# Patient Record
Sex: FEMALE | Race: WHITE | NOT HISPANIC OR LATINO | URBAN - METROPOLITAN AREA
[De-identification: names, ages, dates, MRNs, and addresses within clinical notes are randomized per-mention and may not be internally consistent; named-entity substitution may affect disease eponyms.]

---

## 2022-08-12 ENCOUNTER — HOSPITAL ENCOUNTER (EMERGENCY)
Facility: HOSPITAL | Age: 42
Discharge: PSYCHIATRIC OR SUBSTANCE USE FACILITY | End: 2022-08-13
Attending: EMERGENCY MEDICINE
Payer: OTHER GOVERNMENT

## 2022-08-12 DIAGNOSIS — R44.0 AUDITORY HALLUCINATIONS: Primary | ICD-10-CM

## 2022-08-12 DIAGNOSIS — F22 PARANOIA: ICD-10-CM

## 2022-08-12 DIAGNOSIS — Z00.8 MEDICAL CLEARANCE FOR PSYCHIATRIC ADMISSION: ICD-10-CM

## 2022-08-12 LAB
ALBUMIN SERPL BCP-MCNC: 4.4 G/DL (ref 3.5–5.2)
ALP SERPL-CCNC: 67 U/L (ref 38–126)
ALT SERPL W/O P-5'-P-CCNC: 56 U/L (ref 10–44)
AMPHET+METHAMPHET UR QL: NEGATIVE
ANION GAP SERPL CALC-SCNC: 9 MMOL/L (ref 8–16)
APAP SERPL-MCNC: <10 UG/ML (ref 10–20)
AST SERPL-CCNC: 32 U/L (ref 15–46)
B-HCG UR QL: NEGATIVE
BARBITURATES UR QL SCN>200 NG/ML: NEGATIVE
BASOPHILS # BLD AUTO: 0.05 K/UL (ref 0–0.2)
BASOPHILS NFR BLD: 0.4 % (ref 0–1.9)
BENZODIAZ UR QL SCN>200 NG/ML: NEGATIVE
BILIRUB SERPL-MCNC: 0.5 MG/DL (ref 0.1–1)
BILIRUB UR QL STRIP: NEGATIVE
BZE UR QL SCN: NEGATIVE
CALCIUM SERPL-MCNC: 9.8 MG/DL (ref 8.7–10.5)
CANNABINOIDS UR QL SCN: NEGATIVE
CHLORIDE SERPL-SCNC: 107 MMOL/L (ref 95–110)
CLARITY UR REFRACT.AUTO: CLEAR
CO2 SERPL-SCNC: 23 MMOL/L (ref 23–29)
COLOR UR AUTO: YELLOW
CREAT SERPL-MCNC: 0.8 MG/DL (ref 0.5–1.4)
CREAT UR-MCNC: 214.7 MG/DL (ref 15–325)
DIFFERENTIAL METHOD: ABNORMAL
EOSINOPHIL # BLD AUTO: 0.1 K/UL (ref 0–0.5)
EOSINOPHIL NFR BLD: 0.6 % (ref 0–8)
ERYTHROCYTE [DISTWIDTH] IN BLOOD BY AUTOMATED COUNT: 13.2 % (ref 11.5–14.5)
EST. GFR  (NO RACE VARIABLE): >60 ML/MIN/1.73 M^2
ETHANOL SERPL-MCNC: <10 MG/DL
GLUCOSE SERPL-MCNC: 119 MG/DL (ref 70–110)
GLUCOSE UR QL STRIP: NEGATIVE
HCT VFR BLD AUTO: 42.1 % (ref 37–48.5)
HGB BLD-MCNC: 13.9 G/DL (ref 12–16)
HGB UR QL STRIP: NEGATIVE
IMM GRANULOCYTES # BLD AUTO: 0.03 K/UL (ref 0–0.04)
IMM GRANULOCYTES NFR BLD AUTO: 0.2 % (ref 0–0.5)
KETONES UR QL STRIP: NEGATIVE
LEUKOCYTE ESTERASE UR QL STRIP: NEGATIVE
LYMPHOCYTES # BLD AUTO: 3.9 K/UL (ref 1–4.8)
LYMPHOCYTES NFR BLD: 27.5 % (ref 18–48)
MCH RBC QN AUTO: 28.8 PG (ref 27–31)
MCHC RBC AUTO-ENTMCNC: 33 G/DL (ref 32–36)
MCV RBC AUTO: 87 FL (ref 82–98)
METHADONE UR QL SCN>300 NG/ML: NEGATIVE
MONOCYTES # BLD AUTO: 1.1 K/UL (ref 0.3–1)
MONOCYTES NFR BLD: 7.9 % (ref 4–15)
NEUTROPHILS # BLD AUTO: 8.9 K/UL (ref 1.8–7.7)
NEUTROPHILS NFR BLD: 63.4 % (ref 38–73)
NITRITE UR QL STRIP: NEGATIVE
NRBC BLD-RTO: 0 /100 WBC
OPIATES UR QL SCN: NEGATIVE
PCP UR QL SCN>25 NG/ML: NEGATIVE
PH UR STRIP: 6 [PH] (ref 5–8)
PLATELET # BLD AUTO: 342 K/UL (ref 150–450)
PMV BLD AUTO: 11 FL (ref 9.2–12.9)
POTASSIUM SERPL-SCNC: 3.7 MMOL/L (ref 3.5–5.1)
PROT SERPL-MCNC: 7.2 G/DL (ref 6–8.4)
PROT UR QL STRIP: NEGATIVE
RBC # BLD AUTO: 4.82 M/UL (ref 4–5.4)
SALICYLATES SERPL-MCNC: <5 MG/DL (ref 15–30)
SARS-COV-2 RDRP RESP QL NAA+PROBE: NEGATIVE
SODIUM SERPL-SCNC: 139 MMOL/L (ref 136–145)
SP GR UR STRIP: 1.02 (ref 1–1.03)
TOXICOLOGY INFORMATION: NORMAL
URN SPEC COLLECT METH UR: NORMAL
UROBILINOGEN UR STRIP-ACNC: NEGATIVE EU/DL
UUN UR-MCNC: 21 MG/DL (ref 7–17)
WBC # BLD AUTO: 14.02 K/UL (ref 3.9–12.7)

## 2022-08-12 PROCEDURE — 93010 ELECTROCARDIOGRAM REPORT: CPT | Mod: ,,, | Performed by: INTERNAL MEDICINE

## 2022-08-12 PROCEDURE — 93005 ELECTROCARDIOGRAM TRACING: CPT | Mod: ER

## 2022-08-12 PROCEDURE — 99900035 HC TECH TIME PER 15 MIN (STAT): Mod: ER

## 2022-08-12 PROCEDURE — 85025 COMPLETE CBC W/AUTO DIFF WBC: CPT | Mod: ER | Performed by: EMERGENCY MEDICINE

## 2022-08-12 PROCEDURE — 80053 COMPREHEN METABOLIC PANEL: CPT | Mod: ER | Performed by: EMERGENCY MEDICINE

## 2022-08-12 PROCEDURE — 80307 DRUG TEST PRSMV CHEM ANLYZR: CPT | Mod: ER | Performed by: EMERGENCY MEDICINE

## 2022-08-12 PROCEDURE — 82077 ASSAY SPEC XCP UR&BREATH IA: CPT | Mod: ER | Performed by: EMERGENCY MEDICINE

## 2022-08-12 PROCEDURE — 25000003 PHARM REV CODE 250: Mod: ER | Performed by: EMERGENCY MEDICINE

## 2022-08-12 PROCEDURE — 80179 DRUG ASSAY SALICYLATE: CPT | Mod: ER | Performed by: EMERGENCY MEDICINE

## 2022-08-12 PROCEDURE — 99285 EMERGENCY DEPT VISIT HI MDM: CPT | Mod: ER

## 2022-08-12 PROCEDURE — 81003 URINALYSIS AUTO W/O SCOPE: CPT | Mod: ER | Performed by: EMERGENCY MEDICINE

## 2022-08-12 PROCEDURE — 93010 EKG 12-LEAD: ICD-10-PCS | Mod: ,,, | Performed by: INTERNAL MEDICINE

## 2022-08-12 PROCEDURE — 80143 DRUG ASSAY ACETAMINOPHEN: CPT | Mod: ER | Performed by: EMERGENCY MEDICINE

## 2022-08-12 PROCEDURE — U0002 COVID-19 LAB TEST NON-CDC: HCPCS | Mod: ER | Performed by: EMERGENCY MEDICINE

## 2022-08-12 PROCEDURE — 81025 URINE PREGNANCY TEST: CPT | Mod: ER | Performed by: EMERGENCY MEDICINE

## 2022-08-12 RX ORDER — TRAZODONE HYDROCHLORIDE 100 MG/1
TABLET ORAL
Status: ON HOLD | COMMUNITY
End: 2022-08-24 | Stop reason: HOSPADM

## 2022-08-12 RX ORDER — PANTOPRAZOLE SODIUM 40 MG/1
40 TABLET, DELAYED RELEASE ORAL DAILY
COMMUNITY
End: 2022-08-24

## 2022-08-12 RX ORDER — PREGABALIN 200 MG/1
CAPSULE ORAL
COMMUNITY
End: 2022-08-24

## 2022-08-12 RX ORDER — ESCITALOPRAM OXALATE 20 MG/1
20 TABLET ORAL DAILY
Status: ON HOLD | COMMUNITY
End: 2022-08-24 | Stop reason: SDUPTHER

## 2022-08-12 RX ORDER — OMEPRAZOLE/SODIUM BICARBONATE 20MG-1.1G
CAPSULE ORAL
COMMUNITY
End: 2022-08-12

## 2022-08-12 RX ORDER — FAMOTIDINE 20 MG/1
20 TABLET, FILM COATED ORAL 2 TIMES DAILY
COMMUNITY
End: 2022-08-24

## 2022-08-12 RX ORDER — LORAZEPAM 1 MG/1
1 TABLET ORAL
Status: COMPLETED | OUTPATIENT
Start: 2022-08-12 | End: 2022-08-12

## 2022-08-12 RX ADMIN — LORAZEPAM 1 MG: 1 TABLET ORAL at 08:08

## 2022-08-13 VITALS
RESPIRATION RATE: 20 BRPM | OXYGEN SATURATION: 100 % | DIASTOLIC BLOOD PRESSURE: 69 MMHG | TEMPERATURE: 99 F | WEIGHT: 198 LBS | SYSTOLIC BLOOD PRESSURE: 105 MMHG | HEART RATE: 68 BPM | BODY MASS INDEX: 35.08 KG/M2 | HEIGHT: 63 IN

## 2022-08-13 PROBLEM — D72.829 ELEVATED WBC COUNT: Status: ACTIVE | Noted: 2022-08-13

## 2022-08-13 PROBLEM — R44.3 HALLUCINATIONS: Status: ACTIVE | Noted: 2022-08-13

## 2022-08-13 PROBLEM — J45.20 MILD INTERMITTENT ASTHMA WITHOUT COMPLICATION: Status: ACTIVE | Noted: 2022-08-13

## 2022-08-13 NOTE — ED NOTES
Pt states that she arrived to the ED via ambulance for having auditory hallucinations and thinks people are trying to hurt her. Pt reports that she was just discharged from a psych facility today and has no where to go. She states she is homeless at this time. She reports having no family to notify that she is here.

## 2022-08-13 NOTE — ED PROVIDER NOTES
Encounter Date: 2022       History     Chief Complaint   Patient presents with    Psychiatric Evaluation     Pt c/o auditory and visual hallucinations for a couple weeks. Denies any SI/HI. Hx of bipolar. Recently admitted in psych facility.     Nelly Brewster is a 42 y.o. female who  has a past medical history of Bipolar disorder, unspecified.    The patient presents to the ED due to thoughts that people are trying to kill her and increased visual hallucinations which she describes as shadows.  She states this has been ongoing for the past 2 weeks.  She states she was driving and felt worse and called EMS and was brought here.  She states she was discharge from psychiatric facility earlier today.  She cannot recall what medications she is currently taking but states that she took them this morning and that she was recently taken off of Seroquel.  She denies any drugs or alcohol use.  She states she feels shaky and and reports intermittent dizziness for the past 2 weeks.  She reports concerns that people are trying to hurt her.         Review of patient's allergies indicates:  No Known Allergies  Past Medical History:   Diagnosis Date    Bipolar disorder, unspecified      Past Surgical History:   Procedure Laterality Date    ADENOIDECTOMY      APPENDECTOMY       SECTION      HYSTERECTOMY      TONSILLECTOMY       History reviewed. No pertinent family history.  Social History     Tobacco Use    Smoking status: Current Every Day Smoker     Packs/day: 1.00     Types: Cigarettes    Smokeless tobacco: Never Used   Substance Use Topics    Alcohol use: Never    Drug use: Never     Review of Systems   Unable to perform ROS: Psychiatric disorder       Physical Exam     Initial Vitals [22 1853]   BP Pulse Resp Temp SpO2   115/85 99 20 98.5 °F (36.9 °C) 98 %      MAP       --         Physical Exam    Nursing note and vitals reviewed.  Constitutional: She appears well-developed and  well-nourished. She is not diaphoretic. No distress.   HENT:   Head: Normocephalic and atraumatic.   Mouth/Throat: Oropharynx is clear and moist.   Eyes: EOM are normal. Pupils are equal, round, and reactive to light.   Neck: No tracheal deviation present.   Cardiovascular: Normal rate, regular rhythm, normal heart sounds and intact distal pulses.   Pulmonary/Chest: Breath sounds normal. No stridor. No respiratory distress. She has no wheezes.   Abdominal: Abdomen is soft. Bowel sounds are normal. She exhibits no distension and no mass. There is no abdominal tenderness.   Musculoskeletal:         General: No edema. Normal range of motion.     Neurological: She is alert and oriented to person, place, and time. No cranial nerve deficit or sensory deficit.   CN 2-12 intact  Finger to nose within normal limits  Negative romberg  Gait normal  Equal strength to bilateral upper extremities, SILT  Equal strength to bilateral lower extremities, SILT     Skin: Skin is warm and dry. Capillary refill takes less than 2 seconds. No rash noted.   Psychiatric: Thought content is paranoid. She exhibits a depressed mood.         ED Course   Procedures  Labs Reviewed   CBC W/ AUTO DIFFERENTIAL - Abnormal; Notable for the following components:       Result Value    WBC 14.02 (*)     Gran # (ANC) 8.9 (*)     Mono # 1.1 (*)     All other components within normal limits   COMPREHENSIVE METABOLIC PANEL - Abnormal; Notable for the following components:    Glucose 119 (*)     BUN 21 (*)     ALT 56 (*)     All other components within normal limits   SALICYLATE LEVEL - Abnormal; Notable for the following components:    Salicylate Lvl <5.0 (*)     All other components within normal limits   URINALYSIS, REFLEX TO URINE CULTURE    Narrative:     Preferred Collection Type->Urine, Clean Catch  Specimen Source->Urine  Collection Type->Urine, Clean Catch   DRUG SCREEN PANEL, URINE EMERGENCY    Narrative:     Preferred Collection Type->Urine, Clean  Catch  Specimen Source->Urine  Collection Type->Urine, Clean Catch   ALCOHOL,MEDICAL (ETHANOL)   ACETAMINOPHEN LEVEL   SARS-COV-2 RNA AMPLIFICATION, QUAL    Narrative:     Is the patient symptomatic?->No   PREGNANCY TEST, URINE RAPID    Narrative:     Specimen Source->Urine          Imaging Results    None          Medications   LORazepam tablet 1 mg (1 mg Oral Given 8/12/22 2007)     Medical Decision Making:   Initial Assessment:   42-year-old female recently discharged from a psychiatric facility presents with persistent paranoid thinking auditory hallucinations.  Intermittent dizziness.  Her vital signs are stable.  Her neuro exam is nonfocal.  I do not suspect CVA/emergent etiology of her symptoms.  I will leave she meets criteria for pec due to grave disability due to her paranoid thinking and hallucinations.   Differential Diagnosis:   Differential Diagnosis includes, but is not limited to:  Decompensated psychiatric disease (schizophrenia, bipolar disorder, major depression), excited delirium, medication noncompliance, substance abuse/withdrawal, intentional drug overdose, medication toxicity, APAP/ASA overdose, acute stress reaction, personality disorder, malingering, metabolic derangement               ED Course as of 08/12/22 2118   Fri Aug 12, 2022   1952 EKG:  Rate 92.  Normal sinus rhythm.  No STEMI.  No ectopy. [RN]   2117 Patient has been reserved in the ED without acute event.  Her labs without significant abnormality and she is medically cleared for transfer to psychiatric facility. [RN]      ED Course User Index  [RN] Haider Stephenson Jr., MD        Medically cleared for psychiatry placement: 8/12/2022  9:15 PM    Clinical Impression:   Final diagnoses:  [Z00.8] Medical clearance for psychiatric admission  [R44.0] Auditory hallucinations (Primary)  [F22] Paranoia          ED Disposition Condition    Transfer to Psych Facility         ED Prescriptions     None        Follow-up Information    None          Portions of this note were dictated using voice recognition software and may contain dictation related errors in spelling/grammar/syntax not found on text review       Haider Stephenson Jr., MD  08/12/22 8867

## 2022-08-13 NOTE — ED NOTES
Pt provided with a copy of Patient Rights Under [place] Law.  Pt signed acknowledgements of Notification of Rights and witnessed by this writer.

## 2022-08-13 NOTE — ED NOTES
Pt notified that she was accepted at [facility].  Pt acknowledged understanding.

## 2023-04-25 ENCOUNTER — TELEPHONE (OUTPATIENT)
Dept: OBSTETRICS AND GYNECOLOGY | Facility: CLINIC | Age: 43
End: 2023-04-25
Payer: MEDICAID

## 2023-04-25 NOTE — TELEPHONE ENCOUNTER
Attempted to call Kimberly back, but there was no answer and no voicemail. We have not received a referral for this patient.

## 2023-04-25 NOTE — TELEPHONE ENCOUNTER
----- Message from Jyoti Zambrano sent at 4/25/2023 10:19 AM CDT -----  Type:  Needs Medical Advice    Who Called:  Kimberly reyes/ Karina MONSIVAIS   Symptoms (please be specific): -   How long has patient had these symptoms:  -  Pharmacy name and phone #:  -  Would the patient rather a call back or a response via [facility]?    Best Call Back Number: [phone]  Additional Information:  wants to know if referral has been received and if so when will pt be scheduled

## 2023-05-04 ENCOUNTER — TELEPHONE (OUTPATIENT)
Dept: OBSTETRICS AND GYNECOLOGY | Facility: CLINIC | Age: 43
End: 2023-05-04
Payer: MEDICAID

## 2023-05-04 NOTE — TELEPHONE ENCOUNTER
----- Message from Irene Hale sent at 5/4/2023 12:22 PM CDT -----  Contact: Patient  Patient called to consult with nurse or staff regarding a referral. She states she was calling to see if the referral had been received and would like a call back. She can be reached at [phone]. Thanks/

## 2023-05-04 NOTE — TELEPHONE ENCOUNTER
[facility] stated that [facility]. I told her we did not have it. She said she would try calling them to have them send it again.

## 2023-05-05 ENCOUNTER — HOSPITAL ENCOUNTER (EMERGENCY)
Facility: HOSPITAL | Age: 43
Discharge: HOME | End: 2023-05-05
Attending: FAMILY MEDICINE
Payer: MEDICAID

## 2023-05-05 VITALS
RESPIRATION RATE: 18 BRPM | HEART RATE: 90 BPM | TEMPERATURE: 97 F | OXYGEN SATURATION: 97 % | DIASTOLIC BLOOD PRESSURE: 79 MMHG | SYSTOLIC BLOOD PRESSURE: 133 MMHG

## 2023-05-05 DIAGNOSIS — M79.642 LEFT HAND PAIN: Primary | ICD-10-CM

## 2023-05-05 PROCEDURE — 99284 EMERGENCY DEPT VISIT MOD MDM: CPT

## 2023-05-05 RX ORDER — CLINDAMYCIN HYDROCHLORIDE 300 MG/1
300 CAPSULE ORAL EVERY 6 HOURS
Qty: 40 CAPSULE | Refills: 0 | Status: SHIPPED | OUTPATIENT
Start: 2023-05-05 | End: 2023-05-15

## 2023-05-05 RX ORDER — DICLOFENAC SODIUM 75 MG/1
75 TABLET, DELAYED RELEASE ORAL 2 TIMES DAILY PRN
Qty: 20 TABLET | Refills: 0 | Status: SHIPPED | OUTPATIENT
Start: 2023-05-05 | End: 2023-05-15

## 2023-05-06 NOTE — DISCHARGE INSTRUCTIONS
Use diclofenac twice a day as needed for pain  May use Norco 5/325 that was filled yesterday as needed for severe pain.  Recommend keeping left arm elevated, and applying ice to the affected area to help with pain.

## 2023-05-06 NOTE — ED TRIAGE NOTES
Pt states she was instructed by PCP to come to ED for evaluation of left hand to see if she has an infection. Pt states injury took place 2 days ago. Pt c/o increased pain and swelling.

## 2023-05-06 NOTE — ED PROVIDER NOTES
Encounter Date: 2023       History     Chief Complaint   Patient presents with    Hand Pain     Patient is a 43-year-old female presents emergency room complaints of left hand pain.  Patient reports that she was seen in [place] 2 days ago after accidentally stabbing herself in the left hand with scissors.  Patient reports having laceration repair performed there, but reports pain and swelling to the area.  Patient reports she saw her primary care physician today, and was sent to the emergency room for evaluation due to concerns about possible infection.  Patient reports that she had a bad experience in the past from an infection after having a , so patient wants to make sure she is not having any acute discomfort or worsening.  Patient currently calm and cooperative.    The history is provided by the patient.   Review of patient's allergies indicates:  No Known Allergies  Past Medical History:   Diagnosis Date    Bipolar disorder, unspecified     Nerve damage      Past Surgical History:   Procedure Laterality Date    ADENOIDECTOMY      APPENDECTOMY       SECTION      HYSTERECTOMY      TONSILLECTOMY       History reviewed. No pertinent family history.  Social History     Tobacco Use    Smoking status: Every Day     Packs/day: 1.00     Types: Cigarettes    Smokeless tobacco: Never   Substance Use Topics    Alcohol use: Never    Drug use: Never     Review of Systems   Constitutional:  Negative for chills, fatigue and fever.   HENT:  Negative for ear pain, rhinorrhea and sore throat.    Eyes:  Negative for photophobia and pain.   Respiratory:  Negative for cough, shortness of breath and wheezing.    Cardiovascular:  Negative for chest pain.   Gastrointestinal:  Negative for abdominal pain, diarrhea, nausea and vomiting.   Genitourinary:  Negative for dysuria.   Neurological:  Negative for dizziness, weakness and headaches.   All other systems reviewed and are negative.    Physical Exam      Initial Vitals [05/05/23 2145]   BP Pulse Resp Temp SpO2   133/79 90 18 97.4 °F (36.3 °C) 97 %      MAP       --         Physical Exam    Nursing note and vitals reviewed.  Constitutional: She appears well-developed and well-nourished. No distress.   HENT:   Head: Normocephalic and atraumatic.   Eyes: Conjunctivae and EOM are normal. Pupils are equal, round, and reactive to light.   Neck: Neck supple.   Normal range of motion.  Cardiovascular:  Normal rate, regular rhythm, normal heart sounds and intact distal pulses.           Pulmonary/Chest: Breath sounds normal. No respiratory distress. She has no wheezes. She has no rhonchi. She has no rales.   Abdominal: Abdomen is soft. Bowel sounds are normal. She exhibits no distension. There is no abdominal tenderness. There is no rebound and no guarding.   Musculoskeletal:         General: Normal range of motion.      Cervical back: Normal range of motion and neck supple.      Comments: Mild soft tissue swelling of the thenar aspect of the left hand.  Sutures intact.  No surrounding erythema.  Full range of motion of the left wrist without restriction.  Cap refill less than 2 seconds in all digits.     Neurological: She is alert and oriented to person, place, and time.   Skin: Skin is warm and dry. Capillary refill takes less than 2 seconds. No erythema.   Psychiatric: She has a normal mood and affect. Her behavior is normal. Judgment and thought content normal.             ED Course   Procedures  Labs Reviewed - No data to display       Imaging Results    None          Medications - No data to display  Medical Decision Making:   Initial Assessment:   Patient 43-year-old female presents emergency room for evaluation of left hand pain following suture placement performed 2 days prior to outside facility.  Currently site appears clean dry and intact.  No surrounding erythema at present.  Patient currently very concerned that she was going to get a secondary infection.   Discussed risks benefits with the patient, and will place on clindamycin.  A photo of the current sutures in place and hand taken for documentation.    Patient currently requesting pain medication.  On review of prescription monitoring, appears patient filled 12 Brandamore 5/325 yesterday.  Patient informed to use this medication for severe pain, but will also prescribe anti-inflammatories.  Patient stable for discharge to home.  ER precautions given for any acute worsening.                        Clinical Impression:   Final diagnoses:  [M79.642] Left hand pain (Primary)        ED Disposition Condition    Discharge Stable          ED Prescriptions       Medication Sig Dispense Start Date End Date Auth. Provider    diclofenac (VOLTAREN) 75 MG EC tablet Take 1 tablet (75 mg total) by mouth 2 (two) times daily as needed (Pain). 20 tablet 5/5/2023 5/15/2023 Hugo Moffett MD    clindamycin (CLEOCIN) 300 MG capsule Take 1 capsule (300 mg total) by mouth every 6 (six) hours. for 10 days 40 capsule 5/5/2023 5/15/2023 Hugo Moffett MD          Follow-up Information       Follow up With Specialties Details Why Contact Info    [facility] - Emergency Dept Emergency Medicine  As needed, If symptoms worsen [id number]  [facility] [id number] [phone]    Primary Care Physician  In 5 days               Hugo Moffett MD  05/05/23 6136

## 2023-06-16 ENCOUNTER — TELEPHONE (OUTPATIENT)
Dept: OBSTETRICS AND GYNECOLOGY | Facility: CLINIC | Age: 43
End: 2023-06-16
Payer: MEDICAID

## 2023-06-16 NOTE — TELEPHONE ENCOUNTER
----- Message from Irene Hale sent at 6/16/2023  8:58 AM CDT -----  Contact: Melias([facility])  Melisa called to consult with nurse or staff regarding an authorization for the patient. She wanted to verify if the authorization has been received so the patient can get scheduled for an appointment. She would like a call back and can be reached at [phone]. Thanks/MR

## 2023-06-16 NOTE — TELEPHONE ENCOUNTER
Phone call returned to Melisa, I received her voicemail  I left a message we will attempt to reach her on Monday

## 2023-06-26 ENCOUNTER — TELEPHONE (OUTPATIENT)
Dept: OBSTETRICS AND GYNECOLOGY | Facility: CLINIC | Age: 43
End: 2023-06-26
Payer: MEDICAID

## 2023-06-26 NOTE — TELEPHONE ENCOUNTER
Attempted to call number provided. Waited on hold for 13 minutes before speaking to someone. The person states that she does not have information for the [place] office and is unable to relay a message to them.

## 2023-06-26 NOTE — TELEPHONE ENCOUNTER
----- Message from Irene Hale sent at 6/26/2023  1:51 PM CDT -----  Contact: Che([facility])  Che called to consult with nurse or staff regarding the patient. She wanted to verify if the [facility] was [facility]. She would like a call back regarding this and can be reached at [phone]. Thanks/MR

## 2024-05-20 DIAGNOSIS — Z01.419 WELL WOMAN EXAM: Primary | ICD-10-CM

## 2024-06-06 ENCOUNTER — OFFICE VISIT (OUTPATIENT)
Dept: OBSTETRICS AND GYNECOLOGY | Facility: CLINIC | Age: 44
End: 2024-06-06
Payer: OTHER GOVERNMENT

## 2024-06-06 VITALS
BODY MASS INDEX: 27.1 KG/M2 | SYSTOLIC BLOOD PRESSURE: 118 MMHG | HEART RATE: 85 BPM | DIASTOLIC BLOOD PRESSURE: 80 MMHG | WEIGHT: 153 LBS

## 2024-06-06 DIAGNOSIS — Z01.419 WELL WOMAN EXAM: Primary | ICD-10-CM

## 2024-06-06 PROCEDURE — 99386 PREV VISIT NEW AGE 40-64: CPT | Mod: S$GLB,,, | Performed by: STUDENT IN AN ORGANIZED HEALTH CARE EDUCATION/TRAINING PROGRAM

## 2024-06-06 RX ORDER — HALOPERIDOL 2 MG/ML
SOLUTION ORAL 2 TIMES DAILY
COMMUNITY

## 2024-06-06 NOTE — PROGRESS NOTES
Chief Complaint: Annual Exam    HPI: 44 y.o.  here for Annual Exam.  Patient doing well today.  She does complain of sexual sensations around the vagina with no external stimulation.  She does have a history of hysterectomy, she reports she also had a bilateral salpingo-oophorectomy at that time.  She also reports difficulty with orgasm with her partner however she is able to have 1 by herself.  She does report having abnormal Pap smears prior to her hysterectomy, she has not had a Pap smears since her hysterectomy.  She has no other complaints today.    Review of Systems   Constitutional:  Negative for chills and fever.   HENT:  Negative for congestion and sore throat.    Respiratory:  Negative for cough and shortness of breath.    Cardiovascular:  Negative for chest pain and palpitations.   Gastrointestinal:  Negative for abdominal pain, nausea and vomiting.   Genitourinary:  Negative for dysuria, frequency and urgency.   Musculoskeletal:  Negative for back pain.   Skin:  Negative for itching and rash.   Neurological:  Negative for headaches.   All other systems reviewed and are negative.    Past Medical History:   Diagnosis Date    Asthma     Bipolar disorder, unspecified     Nerve damage      Past Surgical History:   Procedure Laterality Date    ADENOIDECTOMY      APPENDECTOMY       SECTION      HYSTERECTOMY      TONSILLECTOMY       Past OB History:   Past Gyn History: + prior abnormal pap smears, denies STD's   Contraception History: status post hysterectomy  Social History: denies etoh, + tob and THC use use  Family History denies breast, colon, ovarian, or uterine cancer  Allergies: NKDA  Current Outpatient Medications   Medication Instructions    ARIPiprazole (ABILIFY) 400 mg, Intramuscular, Every 28 days    EScitalopram oxalate (LEXAPRO) 20 mg, Oral, Daily    haloperidol (HALDOL) 2 mg/mL solution Oral, 2 times daily    traZODone (DESYREL) 150 mg, Oral, Nightly     Physical Exam:  Vitals:     24 0846   BP: 118/80   Pulse: 85     Body mass index is 27.1 kg/m².  Physical Exam  Vitals reviewed. Exam conducted with a chaperone present.   Constitutional:       General: She is not in acute distress.     Appearance: Normal appearance. She is normal weight.   HENT:      Head: Normocephalic and atraumatic.   Eyes:      Extraocular Movements: Extraocular movements intact.      Pupils: Pupils are equal, round, and reactive to light.   Pulmonary:      Effort: Pulmonary effort is normal. No respiratory distress.   Chest:   Breasts:     Breasts are symmetrical.      Right: No inverted nipple, mass, nipple discharge, skin change or tenderness.      Left: No inverted nipple, mass, nipple discharge, skin change or tenderness.   Abdominal:      General: There is no distension.      Palpations: Abdomen is soft.      Tenderness: There is no abdominal tenderness.   Genitourinary:     Comments: Normal external female genitalia, no masses or lesions, vagina pink with rugated, no vaginal bleeding or discharge, vaginal cuff intact  Musculoskeletal:         General: No swelling or tenderness. Normal range of motion.      Cervical back: Normal range of motion and neck supple.   Lymphadenopathy:      Upper Body:      Right upper body: No supraclavicular or axillary adenopathy.      Left upper body: No supraclavicular or axillary adenopathy.   Skin:     General: Skin is warm and dry.   Neurological:      General: No focal deficit present.      Mental Status: She is alert and oriented to person, place, and time.          ASSESSMENT:   Patient is a 44 y.o.  who presents for annual exam     Patient Active Problem List   Diagnosis    Hallucinations    Mild intermittent asthma without complication    Elevated WBC count       PLAN:  Pap today  Mammogram ordered  Patient given reassurance today and counseled on orgasm  Counseling on self-breast exams, diet, and exercise.  Return to clinic for annual or sooner

## 2024-06-11 LAB — Lab: NORMAL

## 2024-08-26 ENCOUNTER — TELEPHONE (OUTPATIENT)
Dept: OBSTETRICS AND GYNECOLOGY | Facility: CLINIC | Age: 44
End: 2024-08-26
Payer: OTHER GOVERNMENT

## 2024-08-26 NOTE — TELEPHONE ENCOUNTER
Patient looking for hormone therapy. I did inform her that she would need to come into the office and speak further with . Pt scheduled for 9/9/2024 @ 10:30am. Pt v/u      ----- Message from Joyce Chavira MA sent at 8/26/2024  9:49 AM CDT -----  Regarding: FW: Medical Advice  Contact: Nelly  This is a [facility] patient.  ----- Message -----  From: Sangeetha Wyatt  Sent: 8/26/2024   9:41 AM CDT  To: [facility] III Staff  Subject: Medical Advice                                   .Type:  Needs Medical Advice    Who Called: Nelly   Symptoms (please be specific):    How long has patient had these symptoms:    Pharmacy name and phone #:    Would the patient rather a call back or a response via [facility]? Call   Best Call Back Number:  [phone]  Additional Information:  Nelly is requesting a callback from the nurse in regard to hormone therapy or treatment.

## 2024-09-09 ENCOUNTER — OFFICE VISIT (OUTPATIENT)
Dept: OBSTETRICS AND GYNECOLOGY | Facility: CLINIC | Age: 44
End: 2024-09-09
Payer: OTHER GOVERNMENT

## 2024-09-09 VITALS
DIASTOLIC BLOOD PRESSURE: 77 MMHG | WEIGHT: 164 LBS | SYSTOLIC BLOOD PRESSURE: 116 MMHG | BODY MASS INDEX: 29.05 KG/M2 | HEART RATE: 99 BPM

## 2024-09-09 DIAGNOSIS — N95.1 MENOPAUSAL SYMPTOM: Primary | ICD-10-CM

## 2024-09-09 RX ORDER — ESTRADIOL 0.03 MG/D
1 PATCH TRANSDERMAL WEEKLY
Qty: 4 PATCH | Refills: 11 | Status: SHIPPED | OUTPATIENT
Start: 2024-09-09 | End: 2025-09-09

## 2024-09-09 NOTE — PROGRESS NOTES
Chief Complaint: menopausal symptoms    HPI: Patient is a 44 y.o. y.o. female  who presents to GYN clinic for menopausal symptoms.  Patient does have a history of hysterectomy with bilateral salpingo-oophorectomy.  She does report being on hormone replacement in the past and had episodes of crying while taking the pill.  She does report being transitioned to a transdermal medication and reports doing better with that medication however she is unable to remember the name.  She has no other complaints today.  Review of systems negative unless mentioned above    Physical Exam:  Vitals:    24 1011   BP: 116/77   Pulse: 99   Weight: 74.4 kg (164 lb)     Gen: NAD, well developed, well nourished  Psych: alert and oriented x 3, normal affect  HEENT: normocephalic, atraumatic  Abd: soft, non-tender, non-distended  Skin: warm and dry  Ext: no c/c/c, moves all extremities  Neurological: normal gait, gross motor function intact    Assessment: Patient is a 44 y.o. y.o. female  with  Patient Active Problem List   Diagnosis    Hallucinations    Mild intermittent asthma without complication    Elevated WBC count     Plan:  Discussed different treatment options including Paxil, hormone replacement therapy, Veozah  Patient would like to be tried on hormones, we discussed the patch has not options patient voices understanding and agreeable plan   Will prescribe climara today  RTC in 1 month     Roman Velasquez MD

## 2024-10-21 ENCOUNTER — PATIENT MESSAGE (OUTPATIENT)
Dept: OBSTETRICS AND GYNECOLOGY | Facility: CLINIC | Age: 44
End: 2024-10-21
Payer: OTHER GOVERNMENT

## 2024-10-21 NOTE — TELEPHONE ENCOUNTER
----- Message from Adeola sent at 10/21/2024  8:11 AM CDT -----  Regarding: cancel appt  Contact: pt  Pt called to cancel appt for today say she is not having issues since taking the patch.  Pt would to know if she needs to be seen.  Pt stated she will call back to reschedule appt stating she does not have transportation today.   Pt can be reached at [phone].    Thanks,